# Patient Record
Sex: FEMALE | Race: WHITE | NOT HISPANIC OR LATINO
[De-identification: names, ages, dates, MRNs, and addresses within clinical notes are randomized per-mention and may not be internally consistent; named-entity substitution may affect disease eponyms.]

---

## 2018-08-16 ENCOUNTER — TRANSCRIPTION ENCOUNTER (OUTPATIENT)
Age: 73
End: 2018-08-16

## 2018-09-02 ENCOUNTER — TRANSCRIPTION ENCOUNTER (OUTPATIENT)
Age: 73
End: 2018-09-02

## 2018-12-21 ENCOUNTER — TRANSCRIPTION ENCOUNTER (OUTPATIENT)
Age: 73
End: 2018-12-21

## 2018-12-24 ENCOUNTER — TRANSCRIPTION ENCOUNTER (OUTPATIENT)
Age: 73
End: 2018-12-24

## 2019-10-10 ENCOUNTER — TRANSCRIPTION ENCOUNTER (OUTPATIENT)
Age: 74
End: 2019-10-10

## 2020-01-08 ENCOUNTER — TRANSCRIPTION ENCOUNTER (OUTPATIENT)
Age: 75
End: 2020-01-08

## 2020-09-20 ENCOUNTER — TRANSCRIPTION ENCOUNTER (OUTPATIENT)
Age: 75
End: 2020-09-20

## 2020-12-11 ENCOUNTER — TRANSCRIPTION ENCOUNTER (OUTPATIENT)
Age: 75
End: 2020-12-11

## 2021-04-06 ENCOUNTER — TRANSCRIPTION ENCOUNTER (OUTPATIENT)
Age: 76
End: 2021-04-06

## 2021-09-16 ENCOUNTER — TRANSCRIPTION ENCOUNTER (OUTPATIENT)
Age: 76
End: 2021-09-16

## 2021-12-17 ENCOUNTER — NON-APPOINTMENT (OUTPATIENT)
Age: 76
End: 2021-12-17

## 2022-04-08 ENCOUNTER — TRANSCRIPTION ENCOUNTER (OUTPATIENT)
Age: 77
End: 2022-04-08

## 2022-05-17 ENCOUNTER — INPATIENT (INPATIENT)
Facility: HOSPITAL | Age: 77
LOS: 0 days | Discharge: HOME | DRG: 93 | End: 2022-05-18
Attending: PSYCHIATRY & NEUROLOGY | Admitting: PSYCHIATRY & NEUROLOGY
Payer: COMMERCIAL

## 2022-05-17 VITALS
HEART RATE: 81 BPM | DIASTOLIC BLOOD PRESSURE: 72 MMHG | SYSTOLIC BLOOD PRESSURE: 121 MMHG | WEIGHT: 102.07 LBS | OXYGEN SATURATION: 99 % | HEIGHT: 58 IN | TEMPERATURE: 98 F | RESPIRATION RATE: 16 BRPM

## 2022-05-17 LAB
ALBUMIN SERPL ELPH-MCNC: 3.7 G/DL — SIGNIFICANT CHANGE UP (ref 3.4–5)
ALP SERPL-CCNC: 59 U/L — SIGNIFICANT CHANGE UP (ref 40–120)
ALT FLD-CCNC: 23 U/L — SIGNIFICANT CHANGE UP (ref 12–42)
ANION GAP SERPL CALC-SCNC: 7 MMOL/L — LOW (ref 9–16)
APTT BLD: 29.1 SEC — SIGNIFICANT CHANGE UP (ref 27.5–35.5)
AST SERPL-CCNC: 14 U/L — LOW (ref 15–37)
BILIRUB SERPL-MCNC: 0.5 MG/DL — SIGNIFICANT CHANGE UP (ref 0.2–1.2)
BUN SERPL-MCNC: 16 MG/DL — SIGNIFICANT CHANGE UP (ref 7–23)
CALCIUM SERPL-MCNC: 9.7 MG/DL — SIGNIFICANT CHANGE UP (ref 8.5–10.5)
CHLORIDE SERPL-SCNC: 103 MMOL/L — SIGNIFICANT CHANGE UP (ref 96–108)
CK SERPL-CCNC: 58 U/L — SIGNIFICANT CHANGE UP (ref 26–192)
CO2 SERPL-SCNC: 30 MMOL/L — SIGNIFICANT CHANGE UP (ref 22–31)
CREAT SERPL-MCNC: 0.69 MG/DL — SIGNIFICANT CHANGE UP (ref 0.5–1.3)
D DIMER BLD IA.RAPID-MCNC: <187 NG/ML DDU — SIGNIFICANT CHANGE UP
EGFR: 90 ML/MIN/1.73M2 — SIGNIFICANT CHANGE UP
GLUCOSE SERPL-MCNC: 92 MG/DL — SIGNIFICANT CHANGE UP (ref 70–99)
HCT VFR BLD CALC: 38.1 % — SIGNIFICANT CHANGE UP (ref 34.5–45)
HGB BLD-MCNC: 12.9 G/DL — SIGNIFICANT CHANGE UP (ref 11.5–15.5)
INR BLD: 0.97 — SIGNIFICANT CHANGE UP (ref 0.88–1.16)
MCHC RBC-ENTMCNC: 30.9 PG — SIGNIFICANT CHANGE UP (ref 27–34)
MCHC RBC-ENTMCNC: 33.9 GM/DL — SIGNIFICANT CHANGE UP (ref 32–36)
MCV RBC AUTO: 91.1 FL — SIGNIFICANT CHANGE UP (ref 80–100)
NRBC # BLD: 0 /100 WBCS — SIGNIFICANT CHANGE UP (ref 0–0)
PCO2 BLDV: 50 MMHG — HIGH (ref 39–42)
PH BLDV: 7.43 — SIGNIFICANT CHANGE UP (ref 7.32–7.43)
PLATELET # BLD AUTO: 245 K/UL — SIGNIFICANT CHANGE UP (ref 150–400)
PO2 BLDV: <35 MMHG — LOW (ref 25–45)
POTASSIUM SERPL-MCNC: 3.9 MMOL/L — SIGNIFICANT CHANGE UP (ref 3.5–5.3)
POTASSIUM SERPL-SCNC: 3.9 MMOL/L — SIGNIFICANT CHANGE UP (ref 3.5–5.3)
PROT SERPL-MCNC: 7.2 G/DL — SIGNIFICANT CHANGE UP (ref 6.4–8.2)
PROTHROM AB SERPL-ACNC: 11.2 SEC — SIGNIFICANT CHANGE UP (ref 10.5–13.4)
RBC # BLD: 4.18 M/UL — SIGNIFICANT CHANGE UP (ref 3.8–5.2)
RBC # FLD: 14.4 % — SIGNIFICANT CHANGE UP (ref 10.3–14.5)
SAO2 % BLDV: 32.9 % — LOW (ref 67–88)
SARS-COV-2 RNA SPEC QL NAA+PROBE: SIGNIFICANT CHANGE UP
SODIUM SERPL-SCNC: 140 MMOL/L — SIGNIFICANT CHANGE UP (ref 132–145)
TROPONIN I, HIGH SENSITIVITY RESULT: 14.6 NG/L — SIGNIFICANT CHANGE UP
WBC # BLD: 7.03 K/UL — SIGNIFICANT CHANGE UP (ref 3.8–10.5)
WBC # FLD AUTO: 7.03 K/UL — SIGNIFICANT CHANGE UP (ref 3.8–10.5)

## 2022-05-17 PROCEDURE — 99285 EMERGENCY DEPT VISIT HI MDM: CPT | Mod: FS

## 2022-05-17 PROCEDURE — 71275 CT ANGIOGRAPHY CHEST: CPT | Mod: 26,MH

## 2022-05-17 PROCEDURE — 73030 X-RAY EXAM OF SHOULDER: CPT | Mod: 26,LT

## 2022-05-17 PROCEDURE — 70450 CT HEAD/BRAIN W/O DYE: CPT | Mod: 26,MH

## 2022-05-17 PROCEDURE — 76536 US EXAM OF HEAD AND NECK: CPT | Mod: 26

## 2022-05-17 PROCEDURE — 93010 ELECTROCARDIOGRAM REPORT: CPT

## 2022-05-17 PROCEDURE — 70551 MRI BRAIN STEM W/O DYE: CPT | Mod: 26

## 2022-05-17 NOTE — H&P ADULT - HISTORY OF PRESENT ILLNESS
STROKE HPI    HPI: 77y Female with PMHx of endometriosis, DJD presented to [facility] ER for C/O L arm tingling X 5days which initially started as L small finger tingling and then her whole LUE felt different (lighter than RUE) which is not worse with movement and has never had this before. Per pt, was on vacation, flying often lifting heavy luggages on her own. Pt started to notice R neck swelling? on 05/01 for which she saw her PCP, scheduled outpt U/S and intermittent R temporal HA which she describes as "flashes/pinging" sensation that lasts less than half a second intermittently X 1week ago( not at present)and then noticed L arm tingling X 5days, L neck and cheek discomfort this am which made her seek medical attention. Denies any CP/SOB/HA/N/V/vision or hearing changes and unilateral weakness. CTH done @ [facility] showed a 2mm round hyperdensity R temporal lobe which may represent hemorrhage Vs calcification.    Ofnote, pt states she has hypotension.

## 2022-05-17 NOTE — ED PROVIDER NOTE - PRINCIPAL DIAGNOSIS
11/13/20 0600   Sleep Analysis   Sleep Report Good   Sleep/Wake Cycle Unremarkable   Hours Slept 8+  (Asleep at 2200)     Pt appeared to sleep soundly throughout the night. Unable to complete C-SSRS at this time, maintain current plan of care. Numbness and tingling in left arm

## 2022-05-17 NOTE — H&P ADULT - NSHPSOCIALHISTORY_GEN_ALL_CORE
SOCIAL HISTORY:   Patient lives with  @ home  Smoking status: Denies  Drinking: half a glass of wine atleast every night or every other night.  Drug Use:  Denies

## 2022-05-17 NOTE — H&P ADULT - ASSESSMENT
ASSESSMENT/PLAN:    77y Female w/ PMH of endometriosis, DJD presented to [facility] ER for C/O L arm tingling and numbness X 5days. Pt P/W couple of weeks of R neck swelling with outpt work up by PCP with ultrasound, atleast a week of intermittent R temporal HA which she describes as pinging/ flashing sensation that lasts less than half a second, denies any current HA now and 5 days of L arm tingling that initially started as L small finger tingling and felt her whole LUE was different or lighter than RUE and noticed L neck discomfort turning her head this am. Pt attributes these symptoms to lifting heavy luggages while she was travelling on her vacation which includes multiple flight trips. Denies any CP/SOB/leg/calf pain. CTH @ [facility] showed a 2mm hyperdensity R temporal region which may represent hemorrhage Vs calcification and was transferred to [facility] under stroke neurology for further work up and follow up.    Neuro  #CVA workup  - No AC/AP 2/2 ? hemorrhage in CTH  - q4hr stroke neuro checks and vitals  - obtain MRI Brain without contrast inc GRE :   -  HCT Results: 2mm round hyperdensity in R temporal lobe which could represent Hge Vs calcification  - Stroke education    Cards  #HTN  - permissive hypertension, Goal -180  - Not on any home BP meds  - Stroke Code EKG Results: normal with no HORACIO.    #HLD  - LDL results: pending    Pulm  - call provider if SPO2 < 94%  - CTA PE protocol : No PE    R neck swelling :  - US soft tissue  neck : The right internal jugular vein is larger than the left, which is a known normal condition. No abnormality is seen.     GI  #Nutrition/Fluids/Electrolytes   - replete K<4 and Mg <2  - Diet: Regular  - IVF: None    Renal  - C/T trend Bun/Crt    Infectious Disease  - C/T trend WBC    Endocrine  - A1C results: Pending  - TSH results: Pending    DVT Prophylaxis  - SCDs for DVT prophylaxis  - No chemoppx 2/2 ? hemorrhage in CTH       Dispo: pending PT/OT eval   Discussed daily hospital plans and goals with patient and family at bedside.       Discussed with Neurology Attending Dr. Soares.

## 2022-05-17 NOTE — H&P ADULT - NSHPLABSRESULTS_GEN_ALL_CORE
Vital Signs Last 24 Hrs  T(C): 37 (17 May 2022 21:45), Max: 37 (17 May 2022 21:45)  T(F): 98.6 (17 May 2022 21:45), Max: 98.6 (17 May 2022 21:45)  HR: 72 (17 May 2022 21:39) (70 - 81)  BP: 120/73 (17 May 2022 21:39) (120/73 - 145/76)  BP(mean): 93 (17 May 2022 21:39) (93 - 96)  RR: 16 (17 May 2022 21:39) (16 - 17)  SpO2: 99% (17 May 2022 21:39) (98% - 99%)    LABS:                        12.9   7.03  )-----------( 245      ( 17 May 2022 13:58 )             38.1     05-17    140  |  103  |  16  ----------------------------<  92  3.9   |  30  |  0.69    Ca    9.7      17 May 2022 13:58    TPro  7.2  /  Alb  3.7  /  TBili  0.5  /  DBili  x   /  AST  14<L>  /  ALT  23  /  AlkPhos  59  05-17    PT/INR - ( 17 May 2022 13:58 )   PT: 11.2 sec;   INR: 0.97          PTT - ( 17 May 2022 13:58 )  PTT:29.1 sec  CARDIAC MARKERS ( 17 May 2022 13:58 )  x     / x     / 58 U/L / x     / x            RADIOLOGY & ADDITIONAL STUDIES:    CT Head No Cont (05.17.22 @ 14:08)     IMPRESSION:  There is an asymmetric 2 mm round hyperdensity in the right temporal lobe   medially. This finding could represent acute hemorrhage versus   calcification.    CT Angio Chest PE Protocol w/ IV Cont (05.17.22 @ 16:29)     IMPRESSION: No evidence of PE.    US Head + Neck Soft Tissue (05.17.22 @ 15:10)     IMPRESSION: The right internal jugular vein is larger than the left,   which is a known normal condition. No abnormality is seen.  The soft tissues of the neck   appear normal.

## 2022-05-17 NOTE — H&P ADULT - NSHPREVIEWOFSYSTEMS_GEN_ALL_CORE
ROS:   Constitutional: No fever, weight loss or fatigue  Eyes: No eye pain, visual disturbances, or discharge  ENMT:  No difficulty hearing, tinnitus, vertigo; No sinus or throat pain  Neck: c/o L neck discomfort when turning her head this am. Denies any pain or discomfort at this point of time.  Respiratory: No cough, wheezing, chills or hemoptysis  Cardiovascular: No chest pain, palpitations, shortness of breath, dizziness or leg swelling  Gastrointestinal: No abdominal pain. No nausea, vomiting or hematemesis.  Genitourinary: No dysuria, frequency, hematuria or incontinence  Neurological: As per HPI  Skin: No itching, burning, rashes or lesions   Musculoskeletal: No joint pain or swelling; No muscle, back or extremity pain

## 2022-05-17 NOTE — ED PROVIDER NOTE - CLINICAL SUMMARY MEDICAL DECISION MAKING FREE TEXT BOX
Patient with CT scan changes and sensory changes and will be admitted to neurology telemetry - ARNOL GANDHI 76 yo F, PMHx DJD here c/o L arm tingling x 5 days.  VSS, afebrile, well-appearing.  Exam benign, NVI.    R/O signs of CVA, MI, PE, if WNL possibly neuropathy.  Discussed with pt's PCP Dr. Yves Aden ([phone]) who would like for MI and PE to be ruled out, is also requesting for US neck and CT PE.  EKG, labs, covid swab, CT PE, US neck, CT head.  Will F/U results and reassess.

## 2022-05-17 NOTE — PATIENT PROFILE ADULT - FALL HARM RISK - RISK INTERVENTIONS

## 2022-05-17 NOTE — ED ADULT NURSE NOTE - OBJECTIVE STATEMENT
Pt is a 77y female complaining of left arm tingling that started 5 days ago. Pt states that she first noticed the numbness to her left pinky finger when waking up from a flight. Sensation has traveled up her arm to her left arm to neck. PT with odd feeling to left face this morning. Pt states that she traveled to [place] and [place] last week and was carrying heavy luggage in her left arm. Pt with know left shoulder problems. Pt also complaining of a vein to her R neck that she thought was popping out accompanied by sharp pain to the right side of her head. Pt denies headache or pain at this time. Pt with no facial droop. No slurred speech. Equal strength bilaterally in all extremities. Pt denies chest pain and sob. Pt is a 77y female complaining of left arm tingling that started 5 days ago. Pt states that she first noticed the numbness to her left pinky finger when waking up from a flight. Sensation has traveled up her arm to her neck. PT with odd feeling to left face this morning. Pt states that she traveled to [place] and [place] last week and was carrying heavy luggage in her left arm. Pt with know left shoulder problems. Pt also complaining of a vein to her R neck that she thought was popping out accompanied by sharp pain to the right side of her head  that started two weeks ago pt scheduled to have doopler. Pt denies headache or pain at this time. Pt with no facial droop. No slurred speech. Equal strength bilaterally in all extremities. Pt denies chest pain and sob.

## 2022-05-17 NOTE — ED BEHAVIORAL HEALTH NOTE - BEHAVIORAL HEALTH NOTE
PT is a 77 year old female with history of PMHx DJD presented to the ED complaining of numbness.  SW was consulted to the ED by Provider to complete a psychosocial assessment with PT for hospital admissions.  PT gave verbal consent and an assessment was completed.  Team made aware and SW made available for further assistance.

## 2022-05-17 NOTE — ED PROVIDER NOTE - PROGRESS NOTE DETAILS
CTH showing possible hemorrhage vs. calcification to R temporal lobe.  D/w neurosurgery Dr. Chauhan who advised repeat CTH in 4 hrs.    Also d/w stroke neurologist Dr. Soares regarding case who advised either admission to stroke tele or repeat CT in 4 hrs and if stable, then can schedule outpt MRI. D/w pt regarding admission vs. repeat CT in 4 hrs and outpt MRI, pt opting for admission. D/w pt regarding admission vs. repeat CT in 4 hrs and outpt MRI, pt opting for admission.  Will admit to stroke tele.

## 2022-05-17 NOTE — ED ADULT TRIAGE NOTE - CHIEF COMPLAINT QUOTE
Pt walked in with c/o intermittent numbness in Lt arm and Neck x3 days. Symptoms started while pt was flying from [place]. Pt also report Rt neck vein bulging since May 1st. Negative BEFAST.

## 2022-05-17 NOTE — ED PROVIDER NOTE - NS ED ATTENDING STATEMENT MOD
This was a shared visit with the VALDO. I reviewed and verified the documentation and independently performed the documented:

## 2022-05-17 NOTE — ED PROVIDER NOTE - OBJECTIVE STATEMENT
76 yo F, denying PMHx here c/o L arm tingling x 5 days. Reports noticed onset of abnormal sensation to L arm, denying numbness, from L small finger to L shoulder, not worse with movement, has not had this before. States returned from travel to [place], [place] and [place] last week, was lifting heavy luggage with L arm (is L hand dominant). Reporting chronic L shoulder pain worse with movement, has not had this checked out, states she believes it is from lifting her grandchildren.    Also c/o a vein appearing to be popping out to R side neck x 2 weeks, states she has an US scheduled. Endorsing intermittent sharp pains to R side head, no current HA. 76 yo F, PMHx DJD here c/o L arm tingling x 5 days. Reports noticed onset of abnormal sensation to L arm, denying numbness, from L small finger to L shoulder, not worse with movement, has not had this before. States returned from travel to [place], [place] and [place] last week, was lifting heavy luggage with L arm (is L hand dominant). Reporting chronic L shoulder pain worse with movement, has not had this checked out, states she believes it is from lifting her grandchildren.    Also c/o a vein appearing to be popping out to R side neck x 2 weeks, states she has an US scheduled. Endorsing intermittent sharp pains to R side head, no current RINCON.    R/O MI, PE,     Denies CP, SOB/difficulty breathing,    US neck 76 yo F, PMHx DJD here c/o L arm tingling x 5 days. Reports noticed onset of abnormal sensation to L arm, denying numbness, from L small finger to L shoulder, not worse with movement, has not had this before. States returned from travel to [place], [place] and [place] last week, was lifting heavy luggage with L arm (is L hand dominant). Reporting chronic L shoulder pain worse with movement, has not had this checked out, states she believes it is from lifting her grandchildren.    Also c/o a vein to R side neck that appears more prominent x 2 weeks, states she has an US scheduled outpatient. Endorsing intermittent sharp pains to R side head, no current HA.    Denies CP, SOB/difficulty breathing, lightheadedness/dizziness, N/V, back/abd pain, URI Sx, fevers/chills, head injury/fall.

## 2022-05-17 NOTE — ED PROVIDER NOTE - CRANIAL NERVE AND PUPILLARY EXAM
cranial nerves 2-12 intact/central and peripheral vision intact/peripheral vision intact/extra-ocular movements intact/tongue is midline

## 2022-05-17 NOTE — ED ADULT NURSE NOTE - WILL THE PATIENT ACCEPT THE PFIZER COVID-19 VACCINE IF ELIGIBLE AND IT IS AVAILABLE?
This is a 59 year old female with pmhx of DM, HLDA, h/o cardiac effusion, ESRD on HD M/W/Fri with AV fistula c/o weakness since she was admitted to [facility].  She reports was discharged yesterday and went home and visiting nurse noticed she was unable to walk with walker.  She admits while getting her up to walk she fell backwards into bed.  She denies any LOC, head, neck or back pain.  She notes weakness in both of her lower legs.  Describes pain in both of her knees and neck and back.  She admits was admitted for elevated sugar.  She notes takes insulin, doesn't know what her sugar is today.  She denies any headache, abdominal pain, fevers, chills, n/v/d or any sick contacts recent travel or chills.  Patient lives at home with .
Not applicable

## 2022-05-17 NOTE — H&P ADULT - NSHPPHYSICALEXAM_GEN_ALL_CORE
Physical exam:  General: No acute distress, awake and alert  Cardiovascular: Regular rate and rhythm.  Pulmonary: Anterior breath sounds clear bilaterally, no crackles or wheezing. No use of accessory muscles  GI: Abdomen soft, non-tender, non-distended    Neurologic:  -Mental status: Awake, alert, oriented to person, place, and time. Speech is fluent with intact naming, repetition, and comprehension, no dysarthria. Recent and remote memory intact. Follows commands. Attention/concentration intact. Fund of knowledge appropriate.  -Cranial nerves:   II: Visual fields are full to confrontation.  III, IV, VI: Extraocular movements are intact without nystagmus. Pupils equally round and reactive to light  V:  Facial sensation V1-V3 equal and intact   VII: Face is symmetric with normal eye closure and smile  VIII: Hearing is bilaterally intact to finger rub  IX, X: Uvula is midline and soft palate rises symmetrically  XI: Head turning and shoulder shrug are intact.  XII: Tongue protrudes midline  Motor: Normal bulk and tone. No pronator drift. Strength bilateral upper extremity 5/5, bilateral lower extremities 5/5.  Rapid alternating movements intact and symmetric  Sensation: Intact to light touch bilaterally. No neglect or extinction on double simultaneous testing.  Coordination: No dysmetria on finger-to-nose and heel-to-shin bilaterally  Reflexes: Downgoing toes bilaterally  Gait: Narrow gait and steady     NIHSS: 0

## 2022-05-17 NOTE — ED ADULT NURSE REASSESSMENT NOTE - NS ED NURSE REASSESS COMMENT FT1
Break coverage for aleisha barakat introduced self to patient, gcs 15 awaiting another ct scan, pt frustrated that she been here so long, apologised to her, pt still hainvg numbness to left little finger

## 2022-05-17 NOTE — ED ADULT NURSE REASSESSMENT NOTE - NS ED NURSE REASSESS COMMENT FT1
Pt resting in bed at this time. PT denies tingling and numbness. Pt updated on plan of care. Pt on cardiac monitor. Will continue to monitor.

## 2022-05-18 ENCOUNTER — TRANSCRIPTION ENCOUNTER (OUTPATIENT)
Age: 77
End: 2022-05-18

## 2022-05-18 VITALS — TEMPERATURE: 98 F

## 2022-05-18 DIAGNOSIS — R20.0 ANESTHESIA OF SKIN: ICD-10-CM

## 2022-05-18 DIAGNOSIS — R93.0 ABNORMAL FINDINGS ON DIAGNOSTIC IMAGING OF SKULL AND HEAD, NOT ELSEWHERE CLASSIFIED: ICD-10-CM

## 2022-05-18 LAB
A1C WITH ESTIMATED AVERAGE GLUCOSE RESULT: 5.2 % — SIGNIFICANT CHANGE UP (ref 4–5.6)
ANION GAP SERPL CALC-SCNC: 8 MMOL/L — SIGNIFICANT CHANGE UP (ref 5–17)
BUN SERPL-MCNC: 15 MG/DL — SIGNIFICANT CHANGE UP (ref 7–23)
CALCIUM SERPL-MCNC: 9.2 MG/DL — SIGNIFICANT CHANGE UP (ref 8.4–10.5)
CHLORIDE SERPL-SCNC: 105 MMOL/L — SIGNIFICANT CHANGE UP (ref 96–108)
CHOLEST SERPL-MCNC: 216 MG/DL — HIGH
CO2 SERPL-SCNC: 25 MMOL/L — SIGNIFICANT CHANGE UP (ref 22–31)
CREAT SERPL-MCNC: 0.79 MG/DL — SIGNIFICANT CHANGE UP (ref 0.5–1.3)
EGFR: 78 ML/MIN/1.73M2 — SIGNIFICANT CHANGE UP
ESTIMATED AVERAGE GLUCOSE: 103 MG/DL — SIGNIFICANT CHANGE UP (ref 68–114)
GLUCOSE SERPL-MCNC: 91 MG/DL — SIGNIFICANT CHANGE UP (ref 70–99)
HCT VFR BLD CALC: 38.5 % — SIGNIFICANT CHANGE UP (ref 34.5–45)
HCV AB S/CO SERPL IA: 0.04 S/CO — SIGNIFICANT CHANGE UP
HCV AB SERPL-IMP: SIGNIFICANT CHANGE UP
HDLC SERPL-MCNC: 108 MG/DL — SIGNIFICANT CHANGE UP
HGB BLD-MCNC: 12.9 G/DL — SIGNIFICANT CHANGE UP (ref 11.5–15.5)
LIPID PNL WITH DIRECT LDL SERPL: 94 MG/DL — SIGNIFICANT CHANGE UP
MAGNESIUM SERPL-MCNC: 2.3 MG/DL — SIGNIFICANT CHANGE UP (ref 1.6–2.6)
MCHC RBC-ENTMCNC: 30.9 PG — SIGNIFICANT CHANGE UP (ref 27–34)
MCHC RBC-ENTMCNC: 33.5 GM/DL — SIGNIFICANT CHANGE UP (ref 32–36)
MCV RBC AUTO: 92.3 FL — SIGNIFICANT CHANGE UP (ref 80–100)
NON HDL CHOLESTEROL: 108 MG/DL — SIGNIFICANT CHANGE UP
NRBC # BLD: 0 /100 WBCS — SIGNIFICANT CHANGE UP (ref 0–0)
PHOSPHATE SERPL-MCNC: 4.3 MG/DL — SIGNIFICANT CHANGE UP (ref 2.5–4.5)
PLATELET # BLD AUTO: 242 K/UL — SIGNIFICANT CHANGE UP (ref 150–400)
POTASSIUM SERPL-MCNC: 4.5 MMOL/L — SIGNIFICANT CHANGE UP (ref 3.5–5.3)
POTASSIUM SERPL-SCNC: 4.5 MMOL/L — SIGNIFICANT CHANGE UP (ref 3.5–5.3)
RBC # BLD: 4.17 M/UL — SIGNIFICANT CHANGE UP (ref 3.8–5.2)
RBC # FLD: 14.6 % — HIGH (ref 10.3–14.5)
SODIUM SERPL-SCNC: 138 MMOL/L — SIGNIFICANT CHANGE UP (ref 135–145)
TRIGL SERPL-MCNC: 69 MG/DL — SIGNIFICANT CHANGE UP
TSH SERPL-MCNC: 3.52 UIU/ML — SIGNIFICANT CHANGE UP (ref 0.27–4.2)
WBC # BLD: 7.13 K/UL — SIGNIFICANT CHANGE UP (ref 3.8–10.5)
WBC # FLD AUTO: 7.13 K/UL — SIGNIFICANT CHANGE UP (ref 3.8–10.5)

## 2022-05-18 PROCEDURE — 85610 PROTHROMBIN TIME: CPT

## 2022-05-18 PROCEDURE — 83036 HEMOGLOBIN GLYCOSYLATED A1C: CPT

## 2022-05-18 PROCEDURE — 36415 COLL VENOUS BLD VENIPUNCTURE: CPT

## 2022-05-18 PROCEDURE — 80061 LIPID PANEL: CPT

## 2022-05-18 PROCEDURE — 99221 1ST HOSP IP/OBS SF/LOW 40: CPT

## 2022-05-18 PROCEDURE — 70551 MRI BRAIN STEM W/O DYE: CPT

## 2022-05-18 PROCEDURE — 85730 THROMBOPLASTIN TIME PARTIAL: CPT

## 2022-05-18 PROCEDURE — 84443 ASSAY THYROID STIM HORMONE: CPT

## 2022-05-18 PROCEDURE — 80048 BASIC METABOLIC PNL TOTAL CA: CPT

## 2022-05-18 PROCEDURE — 82803 BLOOD GASES ANY COMBINATION: CPT

## 2022-05-18 PROCEDURE — 99223 1ST HOSP IP/OBS HIGH 75: CPT

## 2022-05-18 PROCEDURE — 84484 ASSAY OF TROPONIN QUANT: CPT

## 2022-05-18 PROCEDURE — 83735 ASSAY OF MAGNESIUM: CPT

## 2022-05-18 PROCEDURE — 99285 EMERGENCY DEPT VISIT HI MDM: CPT

## 2022-05-18 PROCEDURE — 87635 SARS-COV-2 COVID-19 AMP PRB: CPT

## 2022-05-18 PROCEDURE — 76536 US EXAM OF HEAD AND NECK: CPT

## 2022-05-18 PROCEDURE — 70450 CT HEAD/BRAIN W/O DYE: CPT

## 2022-05-18 PROCEDURE — 85379 FIBRIN DEGRADATION QUANT: CPT

## 2022-05-18 PROCEDURE — 97161 PT EVAL LOW COMPLEX 20 MIN: CPT

## 2022-05-18 PROCEDURE — 84100 ASSAY OF PHOSPHORUS: CPT

## 2022-05-18 PROCEDURE — 85027 COMPLETE CBC AUTOMATED: CPT

## 2022-05-18 PROCEDURE — 71275 CT ANGIOGRAPHY CHEST: CPT

## 2022-05-18 PROCEDURE — 73030 X-RAY EXAM OF SHOULDER: CPT

## 2022-05-18 PROCEDURE — 86803 HEPATITIS C AB TEST: CPT

## 2022-05-18 PROCEDURE — 82550 ASSAY OF CK (CPK): CPT

## 2022-05-18 PROCEDURE — 80053 COMPREHEN METABOLIC PANEL: CPT

## 2022-05-18 NOTE — CONSULT NOTE ADULT - PROBLEM SELECTOR RECOMMENDATION 2
CTH shows an asymmetric 2 mm round hyperdensity in the right temporal lobe medially -- likely represents calcification, as MRI showed no e/o bleeding

## 2022-05-18 NOTE — CONSULT NOTE ADULT - SUBJECTIVE AND OBJECTIVE BOX
Patient is a 77y old  Female who presents with a chief complaint of LUE Tingling and numbness (17 May 2022 22:23)        HPI:   STROKE HPI    HPI: 77y Female with PMHx of endometriosis, DJD presented to [facility] ER for C/O L arm tingling X 5days which initially started as L small finger tingling and then her whole LUE felt different (lighter than RUE) which is not worse with movement and has never had this before. Per pt, was on vacation, flying often lifting heavy luggages on her own. Pt started to notice R neck swelling? on 05/01 for which she saw her PCP, scheduled outpt U/S and intermittent R temporal HA which she describes as "flashes/pinging" sensation that lasts less than half a second intermittently X 1week ago( not at present)and then noticed L arm tingling X 5days, L neck and cheek discomfort this am which made her seek medical attention. Denies any CP/SOB/HA/N/V/vision or hearing changes and unilateral weakness. CTH done @ [facility] showed a 2mm round hyperdensity R temporal lobe which may represent hemorrhage Vs calcification.    Of note, pt states she has hypotension         (17 May 2022 22:23)      PAST MEDICAL & SURGICAL HISTORY:  DJD (degenerative joint disease)          MEDICATIONS  (STANDING):    MEDICATIONS  (PRN):        Social History:                  -  Home Living Status:  lives with her  in an elevator accessible apartment building         -  Prior Home Care Services: none           -  Occupation: retired [place]     Baseline Functional Level Prior to Admission:             - ADL's/ IADL's:  patient states she is fully independent         - ambulatory status PTA:  walked without devices    FAMILY HISTORY:    CBC Full  -  ( 18 May 2022 06:48 )  WBC Count : 7.13 K/uL  RBC Count : 4.17 M/uL  Hemoglobin : 12.9 g/dL  Hematocrit : 38.5 %  Platelet Count - Automated : 242 K/uL  Mean Cell Volume : 92.3 fl  Mean Cell Hemoglobin : 30.9 pg  Mean Cell Hemoglobin Concentration : 33.5 gm/dL  Auto Neutrophil # : x  Auto Lymphocyte # : x  Auto Monocyte # : x  Auto Eosinophil # : x  Auto Basophil # : x  Auto Neutrophil % : x  Auto Lymphocyte % : x  Auto Monocyte % : x  Auto Eosinophil % : x  Auto Basophil % : x      05-18    138  |  105  |  15  ----------------------------<  91  4.5   |  25  |  0.79    Ca    9.2      18 May 2022 06:48  Phos  4.3     05-18  Mg     2.3     05-18    TPro  7.2  /  Alb  3.7  /  TBili  0.5  /  DBili  x   /  AST  14<L>  /  ALT  23  /  AlkPhos  59  05-17            Radiology:    < from: CT Head No Cont (05.17.22 @ 14:08) >  ACC: [id number] EXAM:  CT BRAIN                          PROCEDURE DATE:  05/17/2022          INTERPRETATION:  Emilia AUGUSTIN MD, have reviewed the images and the report   and agree with the findings with the additional modification: Tiny linear   calcifications seen within the mesial right temporal lobe (image #35   series 602) consistent with calcification. No acute intracranial injury   is identified.        PROCEDURE: CT head without intravenous contrast    CLINICAL INDICATION: Left arm tingling status post CABG lifting.    TECHNIQUE:  Multiple axial images were obtained and viewed at 5 mm   intervals from the skull base to the vertex. The images were reviewed in   brain and bone windows. Sagittal and coronal reformations are provided.    COMPARISON EXAMINATION: None.    FINDINGS:  VENTRICLES AND SULCI: The ventricles, cisternal spaces, and sulci are   appropriate for patient's age. No hydrocephalus.  INTRA-AXIAL: There is a 2 mm intraparenchymal hyperdense focus in the   right temporallobe medially as seen on axial image 2-9. No midline   shift. No acute transcortical infarction. There are scattered   hypodensities throughout the periventricular white matter, likely the   sequela of chronic small vessel ischemic disease.  EXTRA-AXIAL: No extra-axial fluid collection is present.  VISUALIZED SINUSES: No air-fluid levels are identified.  VISUALIZED MASTOIDS: Well aerated.  CALVARIUM: No fracture.  MISCELLANEOUS:  None.    IMPRESSION:  There is an asymmetric 2 mm round hyperdensity in the right temporal lobe   medially. This finding could represent acute hemorrhage versus   calcification.      < from: CT Angio Chest PE Protocol w/ IV Cont (05.17.22 @ 16:29) >  ACC: [id number] EXAM:  CT ANGIO CHEST PULM ART WAWI                          PROCEDURE DATE:  05/17/2022          INTERPRETATION:  CTA (CT angiography) of the CHEST dated 5/17/2022 2:12 PM    INDICATION: L arm tingling, s/p 4 flights in 2 weeks, R/OPE    TECHNIQUE: CT angiography of the chest was performed during bolus   injection of intravenous contrast.  Post-processing including the   production of axial and coronal and sagittal multiplanar reformatted   images and coronal maximum intensity projections (MIPs) was performed.    CONTRAST USAGE:  CONTRAST/COMPLICATIONS:  IV Contrast: Omnipaque 350  100 cc administered   0 cc discarded  Oral Contrast: NONE  Complications: None reported at time of study completion    PRIOR STUDY: None.    FINDINGS: No evidence of PE. Normal caliber of the pulmonary conus. The   heart is normal in size.  No pericardial effusion is seen.  The great   vessels are unremarkable.  No mediastinal or hilar lymphadenopathy is   seen.    Evaluation of the pulmonaryparenchyma demonstrates no significant   abnormality.  No pleural effusions are seen.    Limited evaluation of the upper abdomen demonstrates no abnormality.    Evaluation of the osseous structures demonstrates degenerative changes.      IMPRESSION: No evidence of PE.                    Vital Signs Last 24 Hrs  T(C): 36.9 (18 May 2022 07:05), Max: 37 (17 May 2022 21:45)  T(F): 98.4 (18 May 2022 07:05), Max: 98.6 (17 May 2022 21:45)  HR: 62 (18 May 2022 04:11) (62 - 81)  BP: 115/53 (18 May 2022 04:11) (115/53 - 145/76)  BP(mean): 77 (18 May 2022 04:11) (77 - 96)  RR: 16 (18 May 2022 04:11) (16 - 17)  SpO2: 97% (18 May 2022 04:11) (96% - 99%)        REVIEW OF SYSTEMS:      CONSTITUTIONAL: No fever, weight loss, or fatigue  EYES: No eye pain, visual disturbances, or discharge  ENMT:  No difficulty hearing, tinnitus, vertigo; No sinus or throat pain  NECK: No pain or stiffness  BREASTS: No pain, masses, or nipple discharge  RESPIRATORY: No cough, wheezing, chills or hemoptysis; No shortness of breath  CARDIOVASCULAR: No chest pain, palpitations, dizziness, or leg swelling  GASTROINTESTINAL: No abdominal or epigastric pain. No nausea, vomiting, or hematemesis; No diarrhea or constipation. No melena or hematochezia.  GENITOURINARY: No dysuria, frequency, hematuria, or incontinence  NEUROLOGICAL:   per HPI  SKIN: No itching, burning, rashes, or lesions   LYMPH NODES: No enlarged glands  ENDOCRINE: No heat or cold intolerance; No hair loss  MUSCULOSKELETAL: No joint pain or swelling; No muscle, back, or extremity pain  PSYCHIATRIC: No depression, anxiety, mood swings, or difficulty sleeping  HEME/LYMPH: No easy bruising, or bleeding gums  ALLERGY AND IMMUNOLOGIC: No hives or eczema  VASCULAR: no swelling, erythema          Physical Exam:  thin 76 yo  woman lying in semi Jurado's position, awake, alert,  conversant, no acute complaints    Head: normocephalic, atraumatic    Eyes: PERRLA, EOMI, no nystagmus, sclera anicteric    ENT: nasal discharge, uvula midline, no oropharyngeal erythema/exudate    Neck: supple, negative JVD, negative carotid bruits, no thyromegaly    Chest: CTA bilaterally, neg wheeze/ rhonchi/ rales/ crackles/ egophany    Cardiovascular: regular rate and rhythm, neg murmurs/rubs/gallops    Abdomen: soft, non distended, non tender to palpation in all 4 quadrants, negative rebound/guarding, normal bowel sounds    Extremities: WWP, neg cyanosis/clubbing/edema    Neurologic Exam:    Alert and oriented to person, place, date/year, speech fluent w/o dysarthria, follows commands, recent and remote memory intact, repetition intact, comprehension intact,  attention/concentration intact, fund of knowledge appropriate    Cranial Nerves:     II:                         pupils equal, round and reactive to light, visual fields intact   III/ IV/VI:              extraocular movements intact, neg nystagmus, neg ptosis  V:                        facial sensation intact, V1-3 normal  VII:                      face symmetric, no droop, normal eye closure and smile  VIII:                     hearing intact to finger rub bilaterally  IX and X:             no hoarseness, gag intact, palate/ uvula rise symmetrically  XI:                       SCM/ trapezius strength intact bilateral  XII:                      no tongue deviation    Motor Exam:    Right UE:               : 5/5  wrist extensors/ flexors: 5/5  biceps:   5/5                    triceps:  5/5  deltoid:  5/5    negative / positive pronator drift                               Left UE:                : 5/5  wrist extensors/ flexors: 5/5  biceps:   5/5                    triceps:  5/5  deltoid:  5/5    negative / positive pronator drift        Right LE:               dorsiflexors:  5/5  plantar flexors:  5/5  quadriceps:  5/5  hamstrings:  5/5  hip flexors:  5/5    Left LE:                  dorsiflexors:  5/5  plantar flexors:  5/5  quadriceps:  5/5  hamstrings:  5/5  hip flexors:  5/5    Sensation:         intact to light touch x 4 extremities, however her L pinky feels "different"                         no neglect or extinction on double simultaneous testing                       DTR:                     biceps/brachioradialis: equal                                              patella/ankle: equal                              neg Babinski                        Coordination:      Finger to Nose:  neg dysmetria bilaterally                                   Heel to shin: wnl bilaterally      Gait:  not tested              PM&R Impression:    1) admitted for r/o stroke, NIHSS 0  2) no focal neuro deficits/ ? Cervical radiculopathy    Recommendations/ Plan :    1) Physical / Occupational therapy focusing on therapeutic exercises, bed mobility/transfer out of bed evaluation, progressive ambulation with assistive devices prn.    2) Anticipated Disposition Plan/Recs  :   d/c home with no post discharge rehab needs                
CC: L arm numbness and tingling    HPI: 78 y/o F c/o L arm numbness and tingling x ~5 days; of note, Pt. reports recent frequent plane travel, which has involved lifting/carrying heavy luggage; Pt. also c/o occasional, brief (lasting seconds) R temporal HAs; Pt. denies F/C, CP, SOB  12-pt ROS otherwise negative    PMH: OA, endometriosis  PSH: s/p partial oophorectomy (1969)  SH: non-smoker; drinks a half-glass wine a few nights a week  FH: father had Parkinson’s; mother had supranuclear palsy; no history of heart attack or stroke in first-degree relatives  Allergies: NKDA    VS: 97.9 109/63 72 16 97% on RA    Exam:   Gen: comfortable appearing in NAD   HEENT: MMM   CV: RRR, no JVD   Lungs: CTA B/L   Abdomen: soft NT ND   Extremities: no edema B/L LE   Skin: WWP   Neuro: A&Ox3, no dysarthria    Labs reviewed; HbA1c 5.2%, LDL 94, TSH 3.520    Images reviewed; CTH shows "an asymmetric 2 mm round hyperdensity in the right temporal lobe medially; this finding could represent acute hemorrhage versus calcification;" CTA chest negative for PE; L shoulder x-ray shows "no acute fracture or dislocation; greater humeral tuberosity brandon-tendinitis; AC joint arthrosis; left basilar granuloma." MRI brain shows "no acute ischemia"    EKG reviewed; NSR, non-ischemic

## 2022-05-18 NOTE — CONSULT NOTE ADULT - ASSESSMENT
per Neurology    77 y o Female w/ PMH of endometriosis, DJD presented to [facility] ER for C/O L arm tingling and numbness X 5days. Pt P/W couple of weeks of R neck swelling with outpt work up by PCP with ultrasound, atleast a week of intermittent R temporal HA which she describes as pinging/ flashing sensation that lasts less than half a second, denies any current HA now and 5 days of L arm tingling that initially started as L small finger tingling and felt her whole LUE was different or lighter than RUE and noticed L neck discomfort turning her head this am. Pt attributes these symptoms to lifting heavy luggages while she was travelling on her vacation which includes multiple flight trips. Denies any CP/SOB/leg/calf pain. CTH @ [facility] showed a 2mm hyperdensity R temporal region which may represent hemorrhage Vs calcification and was transferred to [facility] under stroke neurology for further work up and follow up.    Neuro  #CVA workup  - No AC/AP 2/2 ? hemorrhage in CTH  - q4hr stroke neuro checks and vitals  - obtain MRI Brain without contrast inc GRE :   -  HCT Results: 2mm round hyperdensity in R temporal lobe which could represent Hge Vs calcification  - Stroke education    Cards  #HTN  - permissive hypertension, Goal -180  - Not on any home BP meds  - Stroke Code EKG Results: normal with no HORACIO.    #HLD  - LDL results: pending    Pulm  - call provider if SPO2 < 94%  - CTA PE protocol : No PE    R neck swelling :  - US soft tissue  neck : The right internal jugular vein is larger than the left, which is a known normal condition. No abnormality is seen.     GI  #Nutrition/Fluids/Electrolytes   - replete K<4 and Mg <2  - Diet: Regular  - IVF: None    Renal  - C/T trend Bun/Crt    Infectious Disease  - C/T trend WBC    Endocrine  - A1C results: Pending  - TSH results: Pending    DVT Prophylaxis  - SCDs for DVT prophylaxis  - No chemoppx 2/2 ? hemorrhage in CTH   
76 y/o F w/

## 2022-05-18 NOTE — PHYSICAL THERAPY INITIAL EVALUATION ADULT - PATIENT/FAMILY AGREES WITH PLAN
Ob ultrasound and brief MFM consult    An ultrasound for viability, dating and nuchal translucency was completed today  Elevated BMI at 37  3  See Ob Procedures in EPIC  1  Live, godoy  fetus with size = dates; KERRY 2020  Normal nuchal translucency  Today's ultrasound findings and suggested follow-up were discussed  with the patient  The Sequential Screen was discussed in detail, including the sensitivity for detection of Down syndrome  Definitive prenatal diagnosis is possible only through genetic amniocentesis or CVS   The patient had a fingerstick blood collection for hCG and TRESA-A to complete the initial component of the Sequential Screen  Results should be available within one week  Ob Hx:  Primigravida    Medical Hx: negative except for a Hx of chlamydia infection in the past    Medications:  PNV; completed a course of amoxicillin for a UTI    Surgical Hx: negative    Allergies: None    Social Hx: works in the post office; 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 alcohol or illicit drugs  Family Hx:  Deafness in her mother  R X S: negative  Maternal obesity is associated with an increased risk for adverse pregnancy outcomes, including gestational diabetes, fetal growth abnormalities including macrosomia, fetal structural abnormalities, preeclampsia, venous thromboembolism, stillbirth, and increased likelihood for  section  Serial fetal growth evaluations are recommended during the second half of the pregnancy  Weekly non stress testing is recommended for additional pregnancy surveillance beginning at 36 weeks gestation, sooner if otherwise indicated, with a BMI of 40 or greater  Early screening for gestational diabetes should be considered, with repeat evaluation between 24 and 28 weeks gestation, if initially negative      The IOM recommendations define obesity as a BMI of 30 or greater and do not differentiate between Class I obesity (BMI of 30?34 9), Class II obesity (BMI of 35?39 9), and Class III obesity (BMI of 40 or greater) (2)  Given the limited data by class, the IOM recommendation for weight gain is  (11-20 lb) for all obese women  For an obese pregnant woman who is gaining less weight than recommended but has an appropriately growing fetus, no evidence exists that encouraging increased weight gain to conform with the updated IOM guidelines will improve maternal or fetal outcomes  Recommendations:    1  Follow-up multiple marker serum screening at 16 to 20 weeks gestation is recommended to complete the Sequential Screen  2  Fetal Level II ultrasound imaging is scheduled at about 20 weeks gestation  In addition to review of the ultrasound results I completed a consultation in 20 minutes with > 50% in direct face to face contact and coordination of a plan of care  Thank you for referring your patient to our offices  The limitations of ultrasound to detect all anomalies was reviewed and how it is not  a test to rule out aneuploidy  If you have any further questions do not hesitate to contact us as [phone]        Michael Perez MD yes

## 2022-05-18 NOTE — OCCUPATIONAL THERAPY INITIAL EVALUATION ADULT - PERTINENT HX OF CURRENT PROBLEM, REHAB EVAL
77 y o Female w/ PMH of endometriosis, DJD presented to [facility] ER for C/O L arm tingling and numbness X 5days. Pt P/W couple of weeks of R neck swelling with outpt work up by PCP with ultrasound, atleast a week of intermittent R temporal HA and 5 days of L arm tingling that initially started as L small finger tingling and felt her whole LUE was different or lighter than RUE and noticed L neck discomfort turning her head this am.

## 2022-05-18 NOTE — DISCHARGE NOTE PROVIDER - HOSPITAL COURSE
77y Female with PMH     During this hospital course, patient had a (ischemic/hemorrhagic) stroke located in (left/right.....) as seen on (MRI/CT).   The stroke etiology is likely secondary to:  []atrial fibrillation  []small vessel disease from atherosclerotic risk factors  []other:  []etiology workup still in progress    Patient had the following workup done in house:  CT Head:   MR Head Non Contrast:  CT Angio Head:  CT Angio Neck:  Echo  Labs  other    Physical exam at discharge:  NIHSS at discharge:   mRS at discharge:    New medications on discharge:  Labs to be followed up:  Imaging to be done as outpatient:  Further outpatient workup:   77y Female w/ PMH of endometriosis, DJD presented to [facility] ER for C/O L arm tingling and numbness X 5days. Pt P/W couple of weeks of R neck swelling with outpt work up by PCP with ultrasound, at least a week of intermittent R temporal HA which she describes as pinging/ flashing sensation that lasts less than half a second, denies any current HA now and 5 days of L arm tingling that initially started as L small finger tingling and felt her whole LUE was different or lighter than RUE and noticed L neck discomfort turning her head this am. Pt attributes these symptoms to lifting heavy luggage while she was travelling on her vacation which includes multiple flight trips. Denies any CP/SOB/leg/calf pain. CTH @ [facility] showed a 2mm hyperdensity R temporal region which may represent hemorrhage Vs calcification and was transferred to [facility] under stroke neurology for further work up and follow up. MRI completed which ruled out hemorrhage. No acute ischemia or hemorrhage.  Diagnosis: left fifth finger numbness, now resolved likely peripheral in etiology.     Patient is medically stable for discharge home with outpatient PT.    Patient had the following workup done in house:  CT Head No Cont (05.17.22 @ 14:08)   IMPRESSION:  There is an asymmetric 2 mm round hyperdensity in the right temporal lobe   medially. This finding could represent acute hemorrhage versus   calcification  MR Head Non Contrast:  MR Head No Cont (05.17.22 @ 21:15) >    FINDINGS: The MRI examination of the brain demonstrates the ventricles,   cisternal spaces, and cortical sulci to be appropriate for the patient's   stated age. There is no midline shift or extra-axial collection. The   FLAIR images demonstrate a few scattered punctate foci of increased   signal within the periventricular subcortical white matter which is   nonspecific and may represent the sequela of small vessel ischemic   disease in this patient. The diffusion-weighted images demonstrate no   acute ischemia. The GRE images demonstrate no blood products.    US Head + Neck Soft Tissue (05.17.22 @ 15:10) >    IMPRESSION: The right internal jugular vein is larger than the left,   which is a known normal condition. No abnormality is seen.    IMPRESSION: No acute ischemia.  Physical exam at discharge:  Neurologic:  -Mental status: Awake, alert, oriented to person, place, and time. Speech is fluent with intact naming, repetition, and comprehension, no dysarthria. Recent and remote memory intact. Follows commands. Attention/concentration intact. Fund of knowledge appropriate.  -Cranial nerves:   II: Visual fields are full to confrontation.  III, IV, VI: Extraocular movements are intact without nystagmus. Pupils equally round and reactive to light  V:  Facial sensation V1-V3 equal and intact   VII: Face is symmetric with normal eye closure and smile  VIII: Hearing is bilaterally intact to finger rub  IX, X: Uvula is midline and soft palate rises symmetrically  XI: Head turning and shoulder shrug are intact.  XII: Tongue protrudes midline  Motor: Normal bulk and tone. No pronator drift. Strength bilateral upper extremity 5/5, bilateral lower extremities 5/5.  Rapid alternating movements intact and symmetric  Sensation: Intact to light touch bilaterally. No neglect or extinction on double simultaneous testing.  Coordination: No dysmetria on finger-to-nose and heel-to-shin bilaterally  Reflexes: Downgoing toes bilaterally  Gait: Narrow gait and steady       NIHSS: 0    NIHSS at discharge: 0    New medications on discharge: none    Follow up with PCP in 1-2 weeks as outpatient 77y Female w/ PMH of endometriosis, DJD presented to [facility] ER for C/O L arm tingling and numbness X 5days. Pt P/W couple of weeks of R neck swelling with outpt work up by PCP with ultrasound, at least a week of intermittent R temporal HA which she describes as pinging/ flashing sensation that lasts less than half a second, denies any current HA now and 5 days of L arm tingling that initially started as L small finger tingling and felt her whole LUE was different or lighter than RUE and noticed L neck discomfort turning her head this am. Pt attributes these symptoms to lifting heavy luggage while she was travelling on her vacation which includes multiple flight trips. Denies any CP/SOB/leg/calf pain. CTH @ [facility] showed a 2mm hyperdensity R temporal region which may represent hemorrhage Vs calcification and was transferred to [facility] under stroke neurology for further work up and follow up. MRI completed which ruled out hemorrhage. No acute ischemia or hemorrhage.  Diagnosis: left fifth finger numbness, now resolved likely peripheral in etiology.     Patient is medically stable for discharge home with outpatient PT.    Patient had the following workup done in house:  CT Head No Cont (05.17.22 @ 14:08)   IMPRESSION:  There is an asymmetric 2 mm round hyperdensity in the right temporal lobe   medially. This finding could represent acute hemorrhage versus   calcification  MR Head Non Contrast:  MR Head No Cont (05.17.22 @ 21:15) >    FINDINGS: The MRI examination of the brain demonstrates the ventricles,   cisternal spaces, and cortical sulci to be appropriate for the patient's   stated age. There is no midline shift or extra-axial collection. The   FLAIR images demonstrate a few scattered punctate foci of increased   signal within the periventricular subcortical white matter which is   nonspecific and may represent the sequela of small vessel ischemic   disease in this patient. The diffusion-weighted images demonstrate no   acute ischemia. The GRE images demonstrate no blood products.    US Head + Neck Soft Tissue (05.17.22 @ 15:10) >    IMPRESSION: The right internal jugular vein is larger than the left,   which is a known normal condition. No abnormality is seen.    IMPRESSION: No acute ischemia.  Physical exam at discharge:  Neurologic:  -Mental status: Awake, alert, oriented to person, place, and time. Speech is fluent with intact naming, repetition, and comprehension, no dysarthria. Recent and remote memory intact. Follows commands. Attention/concentration intact. Fund of knowledge appropriate.  -Cranial nerves:   II: Visual fields are full to confrontation.  III, IV, VI: Extraocular movements are intact without nystagmus. Pupils equally round and reactive to light  V:  Facial sensation V1-V3 equal and intact   VII: Face is symmetric with normal eye closure and smile  VIII: Hearing is bilaterally intact to finger rub  IX, X: Uvula is midline and soft palate rises symmetrically  XI: Head turning and shoulder shrug are intact.  XII: Tongue protrudes midline  Motor: Normal bulk and tone. No pronator drift. Strength bilateral upper extremity 5/5, bilateral lower extremities 5/5.  Rapid alternating movements intact and symmetric  Sensation: Intact to light touch bilaterally. No neglect or extinction on double simultaneous testing.  Coordination: No dysmetria on finger-to-nose and heel-to-shin bilaterally  Reflexes: Downgoing toes bilaterally  Gait: Narrow gait and steady     NIHSS at discharge: 0    New medications on discharge: none    Follow up with PCP in 1-2 weeks as outpatient

## 2022-05-18 NOTE — CONSULT NOTE ADULT - PROBLEM SELECTOR RECOMMENDATION 9
no e/o stroke on MRI; L shoulder x-ray shows greater humeral tuberosity brandon-tendinitis, which may be contributing; Pt. reports recent frequent plan travel, which has involved lifting/carrying heavy luggage (possible radiculopathy?); cont. work-up and mgmt per Neuro, OT

## 2022-05-18 NOTE — DISCHARGE NOTE NURSING/CASE MANAGEMENT/SOCIAL WORK - PATIENT PORTAL LINK FT
You can access the FollowMyHealth Patient Portal offered by [facility] by registering at the following website: [email or web address]. By joining [facility]’s FollowMyHealth portal, you will also be able to view your health information using other applications (apps) compatible with our system.

## 2022-05-18 NOTE — PHYSICAL THERAPY INITIAL EVALUATION ADULT - PERTINENT HX OF CURRENT PROBLEM, REHAB EVAL
77y Female with PMHx of endometriosis, DJD presented to [facility] ER for C/O L arm tingling X 5days which initially started as L small finger tingling and then her whole LUE felt different (lighter than RUE) which is not worse with movement and has never had this before. Per pt, was on vacation, flying often lifting heavy luggages on her own.

## 2022-05-18 NOTE — DISCHARGE NOTE NURSING/CASE MANAGEMENT/SOCIAL WORK - NSDCPEFALRISK_GEN_ALL_CORE
For information on Fall & Injury Prevention, visit: [email or web address] OR  [email or web address] OR  https://www.cdc.gov/steadi/patient.html

## 2022-05-18 NOTE — PHYSICAL THERAPY INITIAL EVALUATION ADULT - ADDITIONAL COMMENTS
Pt lives in an elevator access apartment with no HORACIO. Pt was independent with all ADLs prior to admission.

## 2022-05-18 NOTE — DISCHARGE NOTE PROVIDER - NSDCCPCAREPLAN_GEN_ALL_CORE_FT
PRINCIPAL DISCHARGE DIAGNOSIS  Diagnosis: Numbness and tingling in left arm  Assessment and Plan of Treatment:

## 2022-05-18 NOTE — OCCUPATIONAL THERAPY INITIAL EVALUATION ADULT - MANUAL MUSCLE TESTING RESULTS, REHAB EVAL
BUE 5/5 throughout. CN Testing:  II: Visual fields are full to confrontation.  III, IV, VI: Extraocular movements are intact without nystagmus. Pupils equally round and reactive to light  V:  Facial sensation V1-V3 equal and intact   VII: Face is symmetric with normal eye closure and smile  XI: Head turning and shoulder shrug are intact.  XII: Tongue protrudes midline./no strength deficits were identified

## 2022-05-18 NOTE — OCCUPATIONAL THERAPY INITIAL EVALUATION ADULT - MD ORDER
CTH @ [facility] showed a 2mm hyperdensity R temporal region which may represent hemorrhage Vs calcification and was transferred to [facility] under stroke neurology for further work up and follow up. MRI negative for bleed.

## 2022-05-20 DIAGNOSIS — M19.90 UNSPECIFIED OSTEOARTHRITIS, UNSPECIFIED SITE: ICD-10-CM

## 2022-05-20 DIAGNOSIS — Z20.822 CONTACT WITH AND (SUSPECTED) EXPOSURE TO COVID-19: ICD-10-CM

## 2022-05-20 DIAGNOSIS — R20.2 PARESTHESIA OF SKIN: ICD-10-CM

## 2022-05-20 DIAGNOSIS — G89.29 OTHER CHRONIC PAIN: ICD-10-CM

## 2022-05-20 DIAGNOSIS — R22.1 LOCALIZED SWELLING, MASS AND LUMP, NECK: ICD-10-CM

## 2022-05-20 DIAGNOSIS — R20.0 ANESTHESIA OF SKIN: ICD-10-CM

## 2022-05-20 DIAGNOSIS — N80.9 ENDOMETRIOSIS, UNSPECIFIED: ICD-10-CM

## 2023-02-19 ENCOUNTER — NON-APPOINTMENT (OUTPATIENT)
Age: 78
End: 2023-02-19

## 2023-02-25 ENCOUNTER — NON-APPOINTMENT (OUTPATIENT)
Age: 78
End: 2023-02-25

## 2023-08-17 ENCOUNTER — NON-APPOINTMENT (OUTPATIENT)
Age: 78
End: 2023-08-17

## 2023-08-24 ENCOUNTER — NON-APPOINTMENT (OUTPATIENT)
Age: 78
End: 2023-08-24

## 2023-08-30 ENCOUNTER — NON-APPOINTMENT (OUTPATIENT)
Age: 78
End: 2023-08-30

## 2023-11-28 NOTE — OCCUPATIONAL THERAPY INITIAL EVALUATION ADULT - PRECAUTIONS/LIMITATIONS, REHAB EVAL
No protocol for requested medication     Medication: hydrOXYzine (ATARAX) 50 MG tablet  Last office visit date: No recent encounters discussing this medication.   Pharmacy: [id number], [place] [id number] AT [facility]    Order pended, routed to clinician for review.   
no known precautions/limitations

## 2024-09-18 NOTE — DISCHARGE NOTE PROVIDER - NSDCQMPCI_CARD_ALL_CORE
Mom stopped in and signed the medical release form for some of her records then took them with her  
No

## 2024-11-13 NOTE — PATIENT PROFILE ADULT - NSPROGENSOURCEINFO_GEN_A_NUR
Called [facility] to follow up on cardiac clearance and ok to hold  Prasugrel 5 days prior to surgery.  Spoke to nurse Constance and she will fax over clearance.     patient

## 2025-02-25 ENCOUNTER — NON-APPOINTMENT (OUTPATIENT)
Age: 80
End: 2025-02-25

## 2025-03-26 ENCOUNTER — NON-APPOINTMENT (OUTPATIENT)
Age: 80
End: 2025-03-26